# Patient Record
Sex: FEMALE | Race: OTHER | HISPANIC OR LATINO | URBAN - METROPOLITAN AREA
[De-identification: names, ages, dates, MRNs, and addresses within clinical notes are randomized per-mention and may not be internally consistent; named-entity substitution may affect disease eponyms.]

---

## 2024-04-26 ENCOUNTER — LAB (OUTPATIENT)
Dept: LAB | Facility: LAB | Age: 24
End: 2024-04-26
Payer: COMMERCIAL

## 2024-04-26 LAB — COTININE UR QL SCN: NEGATIVE

## 2024-09-27 ENCOUNTER — APPOINTMENT (OUTPATIENT)
Dept: OBSTETRICS AND GYNECOLOGY | Facility: CLINIC | Age: 24
End: 2024-09-27
Payer: COMMERCIAL

## 2024-09-27 VITALS
HEIGHT: 64 IN | SYSTOLIC BLOOD PRESSURE: 112 MMHG | WEIGHT: 191 LBS | DIASTOLIC BLOOD PRESSURE: 70 MMHG | BODY MASS INDEX: 32.61 KG/M2

## 2024-09-27 DIAGNOSIS — Z30.9 ENCOUNTER FOR CONTRACEPTIVE MANAGEMENT, UNSPECIFIED TYPE: ICD-10-CM

## 2024-09-27 DIAGNOSIS — Z12.4 SCREENING FOR CERVICAL CANCER: ICD-10-CM

## 2024-09-27 DIAGNOSIS — Z11.3 ROUTINE SCREENING FOR STI (SEXUALLY TRANSMITTED INFECTION): ICD-10-CM

## 2024-09-27 DIAGNOSIS — Z01.419 ENCOUNTER FOR ANNUAL ROUTINE GYNECOLOGICAL EXAMINATION: Primary | ICD-10-CM

## 2024-09-27 PROCEDURE — 1036F TOBACCO NON-USER: CPT | Performed by: OBSTETRICS & GYNECOLOGY

## 2024-09-27 PROCEDURE — 87591 N.GONORRHOEAE DNA AMP PROB: CPT

## 2024-09-27 PROCEDURE — 88175 CYTOPATH C/V AUTO FLUID REDO: CPT

## 2024-09-27 PROCEDURE — 87661 TRICHOMONAS VAGINALIS AMPLIF: CPT

## 2024-09-27 PROCEDURE — 99385 PREV VISIT NEW AGE 18-39: CPT | Performed by: OBSTETRICS & GYNECOLOGY

## 2024-09-27 PROCEDURE — 87491 CHLMYD TRACH DNA AMP PROBE: CPT

## 2024-09-27 PROCEDURE — 3008F BODY MASS INDEX DOCD: CPT | Performed by: OBSTETRICS & GYNECOLOGY

## 2024-09-27 RX ORDER — DROSPIRENONE AND ETHINYL ESTRADIOL 0.02-3(28)
1 KIT ORAL DAILY
COMMUNITY
End: 2024-09-27 | Stop reason: SDUPTHER

## 2024-09-27 RX ORDER — DROSPIRENONE AND ETHINYL ESTRADIOL 0.02-3(28)
1 KIT ORAL DAILY
Qty: 90 TABLET | Refills: 4 | Status: SHIPPED | OUTPATIENT
Start: 2024-09-27

## 2024-09-27 ASSESSMENT — PAIN SCALES - GENERAL: PAINLEVEL: 0-NO PAIN

## 2024-09-27 NOTE — PROGRESS NOTES
Assessment     PLAN    1. Encounter for annual routine gynecological examination  - no concerns, normal exam    2. Screening for cervical cancer  - THINPREP PAP TEST (21-24)    3. Routine screening for STI (sexually transmitted infection)  - GCT collected from pap  - THINPREP PAP TEST (21-24)    4. Encounter for contraceptive management, unspecified type  - refills of OCP sent  - drospirenone-ethinyl estradioL (Loryna, 28,) 3-0.02 mg tablet; Take 1 tablet by mouth once daily.  Dispense: 90 tablet; Refill: 4      Please return for your next visit in 1 year or sooner as needed.    Latasha James MD      Subjective     Ese Raza is a 24 y.o. female who is here for a routine exam.   PCP = No primary care provider on file.    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      Objective   There were no vitals taken for this visit.     General:   Alert and oriented, in no acute distress   Neck: Supple. No visible thyromegaly.    Breast/Axilla: Normal to palpation bilaterally without masses, skin changes, or nipple discharge.    Abdomen: Soft, non-tender, without masses or organomegaly   Vulva: Normal architecture without erythema, masses, or lesions.    Vagina: Normal mucosa without lesions, masses, or atrophy. No abnormal vaginal discharge.    Cervix: Normal without masses, lesions, or signs of cervicitis.    Uterus: Normal mobile, non-enlarged uterus    Adnexa: Normal without masses or lesions   Pelvic Floor No POP noted. No high tone pelvic floor   Psych Normal affect. Normal mood.

## 2024-09-30 LAB
C TRACH RRNA SPEC QL NAA+PROBE: NEGATIVE
N GONORRHOEA DNA SPEC QL PROBE+SIG AMP: NEGATIVE
T VAGINALIS RRNA SPEC QL NAA+PROBE: NEGATIVE

## 2024-10-02 LAB
CYTOLOGY CMNT CVX/VAG CYTO-IMP: NORMAL
LAB AP HPV HR: NORMAL
LAB AP PAP ADDITIONAL TESTS: NORMAL
LABORATORY COMMENT REPORT: NORMAL
LMP START DATE: NORMAL
PATH REPORT.TOTAL CANCER: NORMAL

## 2025-06-26 ENCOUNTER — APPOINTMENT (OUTPATIENT)
Dept: PRIMARY CARE | Facility: CLINIC | Age: 25
End: 2025-06-26
Payer: COMMERCIAL

## 2025-06-26 VITALS
TEMPERATURE: 97.7 F | HEART RATE: 94 BPM | SYSTOLIC BLOOD PRESSURE: 118 MMHG | DIASTOLIC BLOOD PRESSURE: 79 MMHG | BODY MASS INDEX: 31.76 KG/M2 | OXYGEN SATURATION: 97 % | HEIGHT: 64 IN | WEIGHT: 186 LBS

## 2025-06-26 DIAGNOSIS — Z30.9 ENCOUNTER FOR CONTRACEPTIVE MANAGEMENT, UNSPECIFIED TYPE: ICD-10-CM

## 2025-06-26 DIAGNOSIS — Z13.29 THYROID DISORDER SCREEN: ICD-10-CM

## 2025-06-26 DIAGNOSIS — Z13.0 SCREENING FOR DEFICIENCY ANEMIA: ICD-10-CM

## 2025-06-26 DIAGNOSIS — Z13.220 SCREENING CHOLESTEROL LEVEL: ICD-10-CM

## 2025-06-26 DIAGNOSIS — Z00.00 WELLNESS EXAMINATION: Primary | ICD-10-CM

## 2025-06-26 DIAGNOSIS — Z13.1 DIABETES MELLITUS SCREENING: ICD-10-CM

## 2025-06-26 DIAGNOSIS — Z13.21 ENCOUNTER FOR VITAMIN DEFICIENCY SCREENING: ICD-10-CM

## 2025-06-26 PROCEDURE — 1036F TOBACCO NON-USER: CPT | Performed by: NURSE PRACTITIONER

## 2025-06-26 PROCEDURE — 99395 PREV VISIT EST AGE 18-39: CPT | Performed by: NURSE PRACTITIONER

## 2025-06-26 PROCEDURE — 3008F BODY MASS INDEX DOCD: CPT | Performed by: NURSE PRACTITIONER

## 2025-06-26 RX ORDER — DROSPIRENONE AND ETHINYL ESTRADIOL 0.02-3(28)
1 KIT ORAL DAILY
Qty: 30 TABLET | Refills: 11 | Status: SHIPPED | OUTPATIENT
Start: 2025-06-26

## 2025-06-26 ASSESSMENT — ENCOUNTER SYMPTOMS
EYES NEGATIVE: 1
HEMATOLOGIC/LYMPHATIC NEGATIVE: 1
MUSCULOSKELETAL NEGATIVE: 1
NEUROLOGICAL NEGATIVE: 1
ENDOCRINE NEGATIVE: 1
CONSTITUTIONAL NEGATIVE: 1
CARDIOVASCULAR NEGATIVE: 1
DEPRESSION: 0
RESPIRATORY NEGATIVE: 1
PSYCHIATRIC NEGATIVE: 1
GASTROINTESTINAL NEGATIVE: 1

## 2025-06-26 ASSESSMENT — PAIN SCALES - GENERAL: PAINLEVEL_OUTOF10: 0-NO PAIN

## 2025-06-26 NOTE — PATIENT INSTRUCTIONS
Good Seeing you today.  Continue medications as prescribed.  Healthy diet and Drink plenty of water.  Regular exercises.  Labs drawn today in clinic.  Please see [facility] for results.  Will notify you with abnormal results only by telephone or [facility].  If you have any question about your labs do not hesitate to give our office a call.    If you do not have access to [employer] our office will notify you with any abnormal results.  Please schedule any appointments with ophthalmology/dentist if you are not up-to-date.    Follow-up in 1 year sooner if needed.   Call office any new concerns.  .

## 2025-06-26 NOTE — PROGRESS NOTES
"Subjective   Patient ID: Ese Raza is a 25 y.o. female who presents for [facility] (Wellness check. Patient is not fasting for labs. ).    HPI     Patient presents to clinic to \[facility]\""-wellness visit.    Patient states last PCP was at [facility] several years ago.     Patient denies any current medical problems and takes birth control daily only.      Patient states she has been feeling okay no specific complaints today.      She tells me her brother recently  from lymphoma 2024. She wanted to come in for labs and checkup.      Appetite normal bowel and bladder normal.    Preventive screening/immunizations  Care Gaps Reviewed addressed  Dental visits up-to-date  Gynecology Pap smear up-to-date.    No recent ophthalmology visits- no concerns  Immunizations reviewed and up-to-date.      Review of Systems   Constitutional: Negative.    HENT: Negative.     Eyes: Negative.    Respiratory: Negative.     Cardiovascular: Negative.    Gastrointestinal: Negative.    Endocrine: Negative.    Genitourinary: Negative.    Musculoskeletal: Negative.    Skin: Negative.    Neurological: Negative.    Hematological: Negative.    Psychiatric/Behavioral: Negative.         Objective   /79 (BP Location: Right arm, Patient Position: Sitting, BP Cuff Size: Adult)   Pulse 94   Temp 36.5 °C (97.7 °F) (Temporal)   Ht 1.626 m (5' 4\")   Wt 84.4 kg (186 lb)   SpO2 97%   BMI 31.93 kg/m²     Physical Exam  Vitals reviewed.   Constitutional:       General: She is not in acute distress.     Appearance: Normal appearance. She is not ill-appearing.   HENT:      Right Ear: Tympanic membrane and ear canal normal.      Left Ear: Tympanic membrane and ear canal normal.      Mouth/Throat:      Mouth: Mucous membranes are moist.      Pharynx: Oropharynx is clear.   Eyes:      Conjunctiva/sclera: Conjunctivae normal.      Pupils: Pupils are equal, round, and reactive to light.   Cardiovascular:      Rate and Rhythm: Normal " rate and regular rhythm.   Pulmonary:      Effort: Pulmonary effort is normal.      Breath sounds: Normal breath sounds.   Abdominal:      General: Bowel sounds are normal.      Palpations: Abdomen is soft.   Musculoskeletal:         General: Normal range of motion.      Cervical back: Normal range of motion and neck supple.   Skin:     General: Skin is warm and dry.   Neurological:      General: No focal deficit present.      Mental Status: She is alert and oriented to person, place, and time.   Psychiatric:         Mood and Affect: Mood normal.         Thought Content: Thought content normal.         Assessment/Plan   Diagnoses and all orders for this visit:  Wellness examination  - Unremarkable physical exam  -Labs ordered today in clinic. Patient not fasting today will go to outside lab.  -Follow-up yearly exam.  Encounter for contraceptive management, unspecified type  -     drospirenone-ethinyl estradiol (Loryna, 28,) 3-0.02 mg tablet; Take 1 tablet by mouth once daily.  -     Vitamin B12; Future  Screening for deficiency anemia  -     CBC and Auto Differential; Future  Diabetes mellitus screening  -     Comprehensive Metabolic Panel; Future  -     Hemoglobin A1C; Future  Thyroid disorder screen  -     TSH with reflex to Free T4 if abnormal; Future  Screening cholesterol level  -     Lipid Panel; Future  Encounter for vitamin deficiency screening  -     Vitamin D 25-Hydroxy,Total (for eval of Vitamin D levels); Future

## 2025-06-28 LAB
ALBUMIN SERPL-MCNC: 4.7 G/DL (ref 3.6–5.1)
ALP SERPL-CCNC: 65 U/L (ref 31–125)
ALT SERPL-CCNC: 49 U/L (ref 6–29)
ANION GAP SERPL CALCULATED.4IONS-SCNC: 11 MMOL/L (CALC) (ref 7–17)
AST SERPL-CCNC: 26 U/L (ref 10–30)
BASOPHILS # BLD AUTO: 82 CELLS/UL (ref 0–200)
BASOPHILS NFR BLD AUTO: 0.9 %
BILIRUB SERPL-MCNC: 0.4 MG/DL (ref 0.2–1.2)
BUN SERPL-MCNC: 13 MG/DL (ref 7–25)
CALCIUM SERPL-MCNC: 9.6 MG/DL (ref 8.6–10.2)
CHLORIDE SERPL-SCNC: 100 MMOL/L (ref 98–110)
CHOLEST SERPL-MCNC: 218 MG/DL
CHOLEST/HDLC SERPL: 2.9 (CALC)
CO2 SERPL-SCNC: 26 MMOL/L (ref 20–32)
CREAT SERPL-MCNC: 0.67 MG/DL (ref 0.5–0.96)
EGFRCR SERPLBLD CKD-EPI 2021: 124 ML/MIN/1.73M2
EOSINOPHIL # BLD AUTO: 218 CELLS/UL (ref 15–500)
EOSINOPHIL NFR BLD AUTO: 2.4 %
ERYTHROCYTE [DISTWIDTH] IN BLOOD BY AUTOMATED COUNT: 13.1 % (ref 11–15)
GLUCOSE SERPL-MCNC: 83 MG/DL (ref 65–99)
HCT VFR BLD AUTO: 44.8 % (ref 35–45)
HDLC SERPL-MCNC: 75 MG/DL
HGB BLD-MCNC: 14.1 G/DL (ref 11.7–15.5)
LDLC SERPL CALC-MCNC: 114 MG/DL (CALC)
LYMPHOCYTES # BLD AUTO: 3531 CELLS/UL (ref 850–3900)
LYMPHOCYTES NFR BLD AUTO: 38.8 %
MCH RBC QN AUTO: 29.8 PG (ref 27–33)
MCHC RBC AUTO-ENTMCNC: 31.5 G/DL (ref 32–36)
MCV RBC AUTO: 94.7 FL (ref 80–100)
MONOCYTES # BLD AUTO: 464 CELLS/UL (ref 200–950)
MONOCYTES NFR BLD AUTO: 5.1 %
NEUTROPHILS # BLD AUTO: 4805 CELLS/UL (ref 1500–7800)
NEUTROPHILS NFR BLD AUTO: 52.8 %
NONHDLC SERPL-MCNC: 143 MG/DL (CALC)
PLATELET # BLD AUTO: 309 THOUSAND/UL (ref 140–400)
PMV BLD REES-ECKER: 11.6 FL (ref 7.5–12.5)
POTASSIUM SERPL-SCNC: 4.9 MMOL/L (ref 3.5–5.3)
PROT SERPL-MCNC: 7.9 G/DL (ref 6.1–8.1)
RBC # BLD AUTO: 4.73 MILLION/UL (ref 3.8–5.1)
SODIUM SERPL-SCNC: 137 MMOL/L (ref 135–146)
TRIGL SERPL-MCNC: 169 MG/DL
TSH SERPL-ACNC: 2.12 MIU/L
VIT B12 SERPL-MCNC: 461 PG/ML (ref 200–1100)
WBC # BLD AUTO: 9.1 THOUSAND/UL (ref 3.8–10.8)